# Patient Record
Sex: MALE | Race: WHITE | NOT HISPANIC OR LATINO | Employment: PART TIME | URBAN - METROPOLITAN AREA
[De-identification: names, ages, dates, MRNs, and addresses within clinical notes are randomized per-mention and may not be internally consistent; named-entity substitution may affect disease eponyms.]

---

## 2018-01-05 ENCOUNTER — TELEPHONE (OUTPATIENT)
Dept: UROLOGY | Facility: CLINIC | Age: 63
End: 2018-01-05

## 2018-01-05 NOTE — TELEPHONE ENCOUNTER
Referral received for patient to be seen for gross hematuria.     Left message for patient to call back to schedule an appt.  Referral in top mail slots.

## 2018-01-22 ENCOUNTER — OFFICE VISIT (OUTPATIENT)
Dept: UROLOGY | Facility: CLINIC | Age: 63
End: 2018-01-22
Payer: OTHER GOVERNMENT

## 2018-01-22 ENCOUNTER — TELEPHONE (OUTPATIENT)
Dept: UROLOGY | Facility: CLINIC | Age: 63
End: 2018-01-22

## 2018-01-22 ENCOUNTER — APPOINTMENT (OUTPATIENT)
Dept: LAB | Facility: HOSPITAL | Age: 63
End: 2018-01-22
Attending: NURSE PRACTITIONER
Payer: OTHER GOVERNMENT

## 2018-01-22 VITALS
HEART RATE: 73 BPM | SYSTOLIC BLOOD PRESSURE: 131 MMHG | HEIGHT: 73 IN | BODY MASS INDEX: 31.38 KG/M2 | WEIGHT: 236.75 LBS | DIASTOLIC BLOOD PRESSURE: 84 MMHG

## 2018-01-22 DIAGNOSIS — R31.0 HEMATURIA, GROSS: Primary | ICD-10-CM

## 2018-01-22 PROCEDURE — 99215 OFFICE O/P EST HI 40 MIN: CPT | Mod: PBBFAC,PN | Performed by: NURSE PRACTITIONER

## 2018-01-22 PROCEDURE — 99203 OFFICE O/P NEW LOW 30 MIN: CPT | Mod: S$PBB,,, | Performed by: NURSE PRACTITIONER

## 2018-01-22 PROCEDURE — 87086 URINE CULTURE/COLONY COUNT: CPT

## 2018-01-22 PROCEDURE — 88112 CYTOPATH CELL ENHANCE TECH: CPT | Mod: 26,,, | Performed by: PATHOLOGY

## 2018-01-22 PROCEDURE — 88112 CYTOPATH CELL ENHANCE TECH: CPT | Performed by: PATHOLOGY

## 2018-01-22 PROCEDURE — 99999 PR PBB SHADOW E&M-EST. PATIENT-LVL V: CPT | Mod: PBBFAC,,, | Performed by: NURSE PRACTITIONER

## 2018-01-22 NOTE — TELEPHONE ENCOUNTER
Spoke with Pt. Pt notified of appt scheduled with Dr Galeas on wed 1/24/18 at 1:30. Office location given. Pt verbalized understanding.

## 2018-01-22 NOTE — PATIENT INSTRUCTIONS
Blood in the Urine    Blood in the urine (hematuria) has many possible causes. If it occurs after an injury (such as a car accident or fall), it is most often a sign of bruising to the kidney or bladder. Common causes of blood in the urine include urinary tract infections, kidney stones, inflammation, tumors, or certain other diseases of the kidney or bladder. Menstruation can cause blood to appear in the urine sample, although it is not coming from the urinary tract.  If only a trace amount of blood is present, it will show up on the urine test, even though the urine may be yellow and not pink or red. This may occur with any of the above conditions, as well as heavy exercise or high fever. In this case, your doctor may want to repeat the urine test on another day. This will show if the blood is still present. If it is, then other tests can be done to find out the cause.  Home care  Follow these home care guidelines:  · If your urine does not appear bloody (pink, brown or red) then you do not need to restrict your activity in any way.  · If you can see blood in your urine, rest and avoid heavy exertion until your next exam. Do not use aspirin, blood thinners, or anti-platelet or anti-inflammatory medicines. These include ibuprofen and naproxen. These thin the blood and may increase bleeding.  Follow-up care  Follow up with your healthcare provider, or as advised. If you were injured and had blood in your urine, you should have a repeat urine test in 1 to 2 days. Contact your doctor for this test.  A radiologist will review any X-rays that were taken. You will be told of any new findings that may affect your care.  When to seek medical advice  Call your healthcare provider right away if any of these occur:  · Bright red blood or blood clots in the urine (if you did not have this before)  · Weakness, dizziness or fainting  · New groin, abdominal, or back pain  · Fever of 100.4ºF (38ºC) or higher, or as directed by  your healthcare provider  · Repeated vomiting  · Bleeding from the nose or gums or easy bruising  Date Last Reviewed: 9/1/2016 © 2000-2017 [employer]. [address] [place] [address], [place] [address]. All rights reserved. This information is not intended as a substitute for professional medical care. Always follow your healthcare professional's instructions.        What is Hematuria?  Blood in your urine is a condition known as hematuria. Most of the time, the cause of hematuria is not serious. But, never ignore blood in the urine. Your doctor can evaluate you to find the cause of the bleeding and treat it, if needed.  Types of hematuria  · Gross hematuria means that the blood can be seen by the naked eye. The urine may look pinkish, brownish, or bright red.  · Microscopic hematuria means that the urine is clear, but blood cells can be seen when urine is looked at under a microscope or tested in a lab.  Both types of hematuria can have the same causes. Neither one is necessarily more serious than the other. With either type, you may have other symptoms, such as pain, pressure, or burning when you urinate, abdominal pain, or back pain. Or, you may not have any other symptoms. No matter how much blood is found, the cause of the bleeding needs to be identified.  Finding the cause of hematuria  To evaluate your condition, your doctor will first confirm that blood is indeed present. Then other tests are done to pinpoint where the blood is coming from and why. Your doctor will decide which tests will best determine the cause of your hematuria. Some common tests are listed below.  · History and physical exam  · Lab tests may include urinalysis, a urine culture, a urine cytology, and various blood tests  · Cystoscopy  · Computed tomography (CT) or CT urography  · Magnetic resonance imaging (MRI) or MR urography  · Ultrasound of the kidney  · Kidney biopsy  Causes of hematuria include the very benign (exercised  induced hematuria) to the very severe (cancer of the urinary system). A variety of treatments are available depending on the cause.  Date Last Reviewed: 12/2/2016 © 2000-2017 [employer]. [address] [place] [address], [place] [address]. All rights reserved. This information is not intended as a substitute for professional medical care. Always follow your healthcare professional's instructions.        When Your Child Has Hematuria: Urologic Causes     With microscopic hematuria, blood cells can be seen when urine is looked at under a microscope.     When your child has blood in his or her urine, it's called hematuria. This can be scary to hear. But there are many reasons why hematuria occurs that are not serious. Your healthcare provider suspects a problem in your childs urinary tract is causing hematuria. One or more tests are needed to determine the exact cause. Once the cause is found, the problem can be treated or managed if necessary.   There are two types of hematuria:  · Gross hematuria means that blood can be seen when looking at the urine with the naked eye. The urine may look pinkish, brownish, or bright red.  · Microscopic hematuria means that the urine appears clear, but blood cells can be seen when the urine is looked at under a microscope.  Both types may indicate a problem somewhere in the body. But one is not more serious than the other.  What are the possible causes of hematuria?  · Runs in a family  · Bladder or kidney infection  · Recent strep (streptococcal) infection  · Certain medicines  · Vigorous exercise  · Damage to the urinary tract or catheter use  · Kidney stones  · Blockage in the urinary tract  · Diseases, such as sickle cell anemia  · Kidney disease  How is hematuria diagnosed?  Your healthcare provider will ask you questions about your childs health. A physical exam will also be done to look for problems. One or more of the following tests may be done to find the cause of  your childs hematuria:  · Urinalysis to examine the urine for blood or other problems  · Blood tests to look for infection or kidney disease  · Kidney and bladder ultrasound to create images of the kidney and bladder using sound waves  · A KUB (kidney, ureter, bladder) X-ray to determine if kidney stones or another problem is present  · CT (computed tomography) scan to give the health care provider a more detailed image of the kidney and bladder than a regular X-ray  · Voiding cystourethrogram (VCUG) X-ray to show if reflux (backward flow of urine) is present and how the bladder and urethra function, especially during urination  · Cystoscopy to see inside the urethra and bladder, using a small scope with a camera attached at the end  How is hematuria treated?   Treatment depends on whats causing the bleeding. Your childs healthcare provider will tell you more after the exact cause is determined.  Date Last Reviewed: 12/1/2016  © 5246-6606 [employer]. [address] [place] [address], [place] [address]. All rights reserved. This information is not intended as a substitute for professional medical care. Always follow your healthcare professional's instructions.

## 2018-01-22 NOTE — PROGRESS NOTES
[facility] Note  Staff: EMILY Hammond    Referring provider and please cc: Dr. Vera Prado  PCP: Dr. Vera Prado    Chief Complaint:  Intermittent gross hematuria  Subjective:        HPI: Tj Gutierrez is a 62 y.o. male NEW PATIENT to Urology clinic today referred by Dr. Prado's office for spotting blood for >one year.  Pt states to me today it does not matter what he is doing it comes and goes whether he does physical activity, no activity, or with sexual intercourse he will have intermittent gross hematuria.  He is unable to find a reason why it happens.  He does admit today he has been ignoring symptoms for over a year and is now seeing us today for further evaluation.  (Dr. Prado's ov notes state he has had this now for two years)    Questions asked the pt during ov today:  Urgency: None, urge incontinence? None  BUT, sometimes he feels that he does not completely empty his bladder.  NTF:1 x night, DTF: None  Dysuria: No  Gross Hematuria:Yes- for over a year.  Straining:No, Hesistancy:No, Intermittency:No, Weak stream:No  STDs in past: No  Vasectomy: Yes, .    Last PSA Screenin18   4.87  (Done @Dr. Prado's office)    REVIEW OF SYSTEMS:  Review of Systems   Constitutional: Negative for chills, diaphoresis, fever and weight loss.   HENT: Negative for congestion, hearing loss, nosebleeds and sore throat.    Eyes: Negative for blurred vision and pain.        Wears glasses   Respiratory: Negative for cough and wheezing.    Cardiovascular: Negative for chest pain, palpitations and leg swelling.   Gastrointestinal: Negative for abdominal pain, heartburn, nausea and vomiting.   Genitourinary: Positive for hematuria. Negative for dysuria, flank pain, frequency and urgency.   Musculoskeletal: Negative for back pain, joint pain, myalgias and neck pain.   Skin: Negative for itching and rash.   Neurological: Negative for dizziness, tremors, sensory change, seizures,  loss of consciousness, weakness and headaches.   Endo/Heme/Allergies: Does not bruise/bleed easily.   Psychiatric/Behavioral: Negative for depression and suicidal ideas. The patient is not nervous/anxious.      Physical Exam    PMHx:  Past Medical History:   Diagnosis Date    Hypertension      Kidney stones: No  Wears glasses     PSHx:  Past Surgical History:   Procedure Laterality Date    COLONOSCOPY W/ POLYPECTOMY  10/24/2017    NASAL SEPTOPLASTY W/ TURBINOPLASTY  06/09/2017    SHOULDER SURGERY      VEIN LIGATION AND STRIPPING       Stents/Valves/Foreign Bodies: No  Cardiac Evaluation: No    Screening Studies  Colonoscopy: Last procedure was performed on 2017    Fam Hx:   malignancies: No    kidney stones: No     Soc Hx:  No tobacco use  No alcohol use    Allergies:  Patient has no known allergies.    Medications: reviewed   Anticoagulation: No    Objective:     Vitals:    01/22/18 1057   BP: 131/84   Pulse: 73     General:WDWN in NAD  Eyes: PERRLA, normal conjunctiva  Respiratory: no increased work on breathing, clear to auscultation  Cardiovascular: regular rate and rhythm. No obvious extremity edema.  GI: palpation of masses. No tenderness. No hepatosplenomegaly to palpation.  Musculoskeletal: normal range of motion of bilateral upper extremities. Normal muscle strength and tone.  Skin: no obvious rashes or lesions. No tightening of skin noted.  Neurologic: CN grossly normal. Normal sensation.   Psychiatric: awake, alert and oriented x 3. Mood and affect normal. Cooperative.    LABS REVIEW:  UA today:  Color:Clear, Yellow  Spec. Grav.  1.015  PH  5.0  Negative for leukocytes, nitrates, protein, glucose, ketones, urobili, bili, and blood.    Assessment:       1. Hematuria, gross          Plan:     1.  We will send urine for culture and cytology testing.  2.  CT Urogram and serum cr to be performed prior to next visit.    F/u with Urologist for intermittent gross hematuria work up, lee KENYON  Gary, ANIAP-C

## 2018-01-24 ENCOUNTER — TELEPHONE (OUTPATIENT)
Dept: UROLOGY | Facility: CLINIC | Age: 63
End: 2018-01-24

## 2018-01-24 ENCOUNTER — OFFICE VISIT (OUTPATIENT)
Dept: UROLOGY | Facility: CLINIC | Age: 63
End: 2018-01-24
Payer: OTHER GOVERNMENT

## 2018-01-24 VITALS
SYSTOLIC BLOOD PRESSURE: 139 MMHG | HEIGHT: 73 IN | BODY MASS INDEX: 31.26 KG/M2 | HEART RATE: 61 BPM | TEMPERATURE: 98 F | WEIGHT: 235.88 LBS | DIASTOLIC BLOOD PRESSURE: 84 MMHG

## 2018-01-24 DIAGNOSIS — N36.8 URETHRAL BLEEDING: Primary | ICD-10-CM

## 2018-01-24 DIAGNOSIS — R97.20 ELEVATED PSA: ICD-10-CM

## 2018-01-24 LAB — BACTERIA UR CULT: NO GROWTH

## 2018-01-24 PROCEDURE — 99215 OFFICE O/P EST HI 40 MIN: CPT | Mod: S$PBB,,, | Performed by: UROLOGY

## 2018-01-24 PROCEDURE — 99999 PR PBB SHADOW E&M-EST. PATIENT-LVL IV: CPT | Mod: PBBFAC,,, | Performed by: UROLOGY

## 2018-01-24 PROCEDURE — 99214 OFFICE O/P EST MOD 30 MIN: CPT | Mod: PBBFAC,PN | Performed by: UROLOGY

## 2018-01-24 RX ORDER — GENTAMICIN SULFATE 40 MG/ML
80 INJECTION, SOLUTION INTRAMUSCULAR; INTRAVENOUS ONCE
Status: CANCELLED | OUTPATIENT
Start: 2018-01-30

## 2018-01-24 RX ORDER — CIPROFLOXACIN 500 MG/1
500 TABLET ORAL 2 TIMES DAILY
Qty: 6 TABLET | Refills: 0 | Status: SHIPPED | OUTPATIENT
Start: 2018-01-24

## 2018-01-24 NOTE — LETTER
January 24, 2018        Vear Prado MD  [address] [place] [address] [place] [id number]             [place] [facility]  [address] [place] [address] [address]  [place] [address]  Phone: [phone]  Fax: [phone]   Patient: Tj Gutierrez   MR Number: [id number]   YOB: 1955   Date of Visit: 1/24/2018       Dear Dr. Prado:    Thank you for referring Tj Gutierrez to me for evaluation. Attached you will find relevant portions of my assessment and plan of care.    If you have questions, please do not hesitate to call me. I look forward to following Tj Gutierrez along with you.    Sincerely,        Daniel Galeas MD            CC  No Recipients    Enclosure

## 2018-01-24 NOTE — TELEPHONE ENCOUNTER
Called Dr Prado's office to request records on Pt. Phone number given to [facility] [phone]. Contacted and requested records for today.

## 2018-01-24 NOTE — PROGRESS NOTES
[facility]:    Tj Gutierrez is a 62 y.o. male who presents for evaluation of gross hematuria.    He was referred to our practice by Dr Prado and saw NP Gary earlier this week reporting spotting of blood in his urine for more than one year, which happens with or without physical activity as well as with intercourse. He reported intermittent gross hematuria, which he noted as blood spotting in his underwear, for 2 years at visit with Dr Prado 1/2/18, not mentioned to Dr. Prado previously, despite having seen him multiple times in 2017  He denied urgency/UUI, frequency, hesitancy, intermittency, weak stream. Nocturia x1.  Questionable history of prostatitis 4/13/15  PSA 01/02/18 4.87     PSA history  1/2/18 - 4.87  2/7/17 - 3.8  4/13/15 - 3.6    He did have a colonoscopy with Dr. Pedraza on 10/24/17 noting 3 small polyps, 2 rectal, and one adjacent to the appendiceal orifice with recommendations to repeat colonoscopy in 5-10 years based on biopsy results    He reports today he has never seen blood in his actual urine but has seen blood spotting from tip of the penis, with very bright red spot in his underwear.  Has been 1 week since he saw blood in his underwear. May have an episode once every 10 days.  Was a  at some point.  Denies blood in the semen.  Never smoker.  No personal or family history of kidney.  No family history of any prostate cancer or genitourinary malignancy.  Notes that he has been in denial that this has been happening.  Has no identifiable pattern (as far as relation to activity, intercourse, etc) though does often happen overnight.  Denies ejaculatory, perineal, perirectal pain.  No hesitancy, no intermittency, mild PV dribble, NTF 0-1x (once is only if drinking a lot of fluids), no urgency. DTF 4x. Mild push/strain to pass urine.  Normal daily BMs.  Coffee - 3 big cups in AM, juice/water throughout day, hot mild at night  Mostly bothered by pushing and straining to empty  bladder especially at the end of the stream - so may have some terminal intermittency  Works part time security job  No dysuria, urethral pain, urethral discharge.    Udip negative today       Past Medical History:   Diagnosis Date    Hypertension     humerus fracture 9/14/16  MDD 2/7/17    Past Surgical History:   Procedure Laterality Date    COLONOSCOPY W/ POLYPECTOMY  10/24/2017    NASAL SEPTOPLASTY W/ TURBINOPLASTY  06/09/2017    SHOULDER SURGERY      VEIN LIGATION AND STRIPPING         No family history on file.    Social History     Social History    Marital status:      Spouse name: N/A    Number of children: N/A    Years of education: N/A     Occupational History    Not on file.     Social History Main Topics    Smoking status: Never Smoker    Smokeless tobacco: Never Used    Alcohol use No    Drug use: No    Sexual activity: Yes     Other Topics Concern    Not on file     Social History Narrative    No narrative on file       Review of patient's allergies indicates:  No Known Allergies    Medications Reviewed: see MAR    ROS:    As noted above in HPI otherwise negative x 10 systems reviewed     PHYSICAL EXAM:    Vitals:    01/24/18 1356   BP: 139/84   Pulse: 61   Temp: 98.2 °F (36.8 °C)     Body mass index is 31.12 kg/m².           General: Alert, cooperative, no distress, appears stated age  Head: Normocephalic, without obvious abnormality, atraumatic  Neck: no masses, no thyromegaly, no lymphadenopathy  Eyes: PERRL, conjunctiva/corneas clear  Lungs: Respirations unlabored, normal effort, no accessory muscle use  CV: Warm and well perfused extremities  Abdomen: Soft, non-tender, no CVA tenderness, no hepatosplenomegaly, no hernia  Penis: phallus normal, circumcised, well cared for, no plaques or lesions.   Scrotum: no cysts, no lesions, no rash, no hydrocele.   Epididymes: normal, nontender, symmetrical, no masses or cysts.   Testes: normal, both descended, no masses.   Urethra:  "palpably normal with orthotopic meatus of normal size    DANITA: normal sphincter tone, no masses, no hemmorrhoids   PROSTATE: 35g, no nodules, non-tender, smooth, subtle consistency difference R vs L    Extremities: Extremities normal, atraumatic, no cyanosis or edema  Skin: Normal color, texture, and turgor, no rashes or lesions  Psych: Appropriate, well oriented, normal affect, normal mood  Neuro: Non-focal        Recent Results (from the past 336 hour(s))   Urine culture    Collection Time: 01/22/18 11:36 AM   Result Value Ref Range    Urine Culture, Routine No growth          Assessment/Diagnosis:    1. Urethral bleeding  Case Request Operating Room: TRANSRECTAL ULTRASOUND GUIDED PROSTATE BIOPSY, CYSTOSCOPY   2. Elevated PSA  Case Request Operating Room: TRANSRECTAL ULTRASOUND GUIDED PROSTATE BIOPSY, CYSTOSCOPY    Place in Outpatient    Vital Signs     Activity as tolerated       Plans:    He does not seem to have gross hematuria, though he does have blood per urethra on occasion.  His urinalysis is negative for any signs of blood today.  And he denies actual red or pink urine.  He does confirm blood per urethral meatus spotting in his underwear.  I do agree with a gross hematuria workup, as this is blood from his urinary tract.  He is already scheduled for CT urogram in 2 days.  His urine culture was negative, and his urine cytology is pending.  We did discuss cystoscopy to complete gross hematuria workup and described in detail the procedure office-based diagnostic flexible cystourethroscopy with local anesthesia intraurethrally with Xylocaine jelly.  Not only will this evaluate the lower tract for any causes of blood in the urine, or per the urethra, but also evaluate for any obstruction including prostatic.    I am most suspicious that the source of bleeding is his prostate.  He is rather vague about if any activities exacerbated the condition or bring it on, though as he listed the activities he "wondered if " ""or causing it, he did list sexual intercourse, heavy activity, long truck rides, etc. though denies any specific activity inciting the blood he sees.  He does however have an elevated PSA at 4.8.  I had a long discussion with the patient regarding the natural history of cancer in men as well as when diagnostics are indicated. We also discussed differential for elevated psa which also includes benign enlargement and prostatitis.  He exhibits no signs or symptoms of prostatitis, and though he has a vague history of it, he denies those symptoms at that time in 2015 as well and had more UTI symptoms which resolved with a short course of antibiotics per his recollection.  No PSA changes from baseline at that time.     We did discuss that an elevated PSA is considered a PSA greater than 4 because statistically 20% of people in this value range are found to have prostate cancer, however we also discussed a bit about PSA velocity and age-specific elevations, as well as PSA relative to prostate volume (PSA density). His finding of PSA of 4.8, both elevated and elevated for his age and estimated prostate size, is also a significant velocity change from prior baseline over the last 3 years.  In this patient with elevated PSA and with mild obstructive lower urinary tract symptoms, and blood per urethra,  I did therefore recommended prostate biopsy to evaluate for underlying malignancy.  We discussed biopsy and in detail, including 1% risk of infectious complications including sepsis but that it is an otherwise safe diagnostic procedure with expected hematuria hematospermia after.  Should his biopsy be negative, and his gross hematuria workup indicate a prostatic source of bleeding, can discuss medical management.  Did note would need this full evaluation prior and patient was agreeable to treatment plan.     I went over the details of a transrectal ultrasound-guided biopsy of the prostate, and described the technique in " detail.   The patient will be given local injection anesthetic to block the prostate so as to minimize any pain. 12-14 biopsy specimens will be taken. These will be sent for histopathology analysis.   Complications include bleeding, fever and chills. He was also instructed to watch for any signs of fever. If he does have any fever or chills after, he was advised to come to the emergency room right away for intravenous antibiotics and possible admission to the hospital. He is to refrain from any strenuous activity including sexual activity for the next 72 hours after biopsy. He was also advised that he may have blood while urinating, during bowel movements as well as during ejaculations. He was given a prebiopsy/postbiopsy instruction sheet was reminding him to avoid aspirin and blood thinners for 7 days prior, take the Rxed antibiotics the day before, day of, day after biopsy, and perform a fleet enema at home morning of biopsy. All questions he had were answered in detail.      Cystoscopy and TRUS/bx scheduled at [facility] on 1/30/18.

## 2018-01-26 ENCOUNTER — HOSPITAL ENCOUNTER (OUTPATIENT)
Dept: RADIOLOGY | Facility: HOSPITAL | Age: 63
Discharge: HOME | End: 2018-01-26
Attending: NURSE PRACTITIONER
Payer: OTHER GOVERNMENT

## 2018-01-26 DIAGNOSIS — R31.0 HEMATURIA, GROSS: ICD-10-CM

## 2018-01-26 PROCEDURE — 74178 CT ABD&PLV WO CNTR FLWD CNTR: CPT | Mod: 26,,, | Performed by: RADIOLOGY

## 2018-01-26 PROCEDURE — 25500020 PHARM REV CODE 255

## 2018-01-26 PROCEDURE — 74178 CT ABD&PLV WO CNTR FLWD CNTR: CPT | Mod: TC

## 2018-01-26 RX ORDER — SODIUM CHLORIDE 9 MG/ML
INJECTION, SOLUTION INTRAVENOUS
Status: DISPENSED
Start: 2018-01-26 | End: 2018-01-26

## 2018-01-26 RX ADMIN — IOHEXOL 100 ML: 350 INJECTION, SOLUTION INTRAVENOUS at 08:01

## 2018-01-30 ENCOUNTER — SURGERY (OUTPATIENT)
Age: 63
End: 2018-01-30

## 2018-01-30 ENCOUNTER — HOSPITAL ENCOUNTER (OUTPATIENT)
Facility: AMBULARY SURGERY CENTER | Age: 63
Discharge: HOME | End: 2018-01-30
Attending: UROLOGY | Admitting: UROLOGY
Payer: OTHER GOVERNMENT

## 2018-01-30 DIAGNOSIS — N36.8 URETHRAL BLEEDING: ICD-10-CM

## 2018-01-30 DIAGNOSIS — R97.20 ELEVATED PSA: ICD-10-CM

## 2018-01-30 PROCEDURE — 76942 ECHO GUIDE FOR BIOPSY: CPT | Mod: 26,59,, | Performed by: UROLOGY

## 2018-01-30 PROCEDURE — 88305 TISSUE EXAM BY PATHOLOGIST: CPT | Performed by: PATHOLOGY

## 2018-01-30 PROCEDURE — 52000 CYSTOURETHROSCOPY: CPT | Mod: 59,,, | Performed by: UROLOGY

## 2018-01-30 PROCEDURE — 76872 US TRANSRECTAL: CPT | Performed by: UROLOGY

## 2018-01-30 PROCEDURE — 55700 PR BIOPSY OF PROSTATE,NEEDLE/PUNCH: CPT | Mod: ,,, | Performed by: UROLOGY

## 2018-01-30 PROCEDURE — 55700 HC PROSTATE NEEDLE BIOPSY: CPT | Performed by: UROLOGY

## 2018-01-30 PROCEDURE — 76872 US TRANSRECTAL: CPT | Mod: 26,,, | Performed by: UROLOGY

## 2018-01-30 PROCEDURE — 52000 CYSTOURETHROSCOPY: CPT | Performed by: UROLOGY

## 2018-01-30 RX ORDER — LIDOCAINE HYDROCHLORIDE 10 MG/ML
INJECTION, SOLUTION EPIDURAL; INFILTRATION; INTRACAUDAL; PERINEURAL
Status: DISCONTINUED | OUTPATIENT
Start: 2018-01-30 | End: 2018-01-30 | Stop reason: HOSPADM

## 2018-01-30 RX ORDER — WATER 1 ML/ML
IRRIGANT IRRIGATION
Status: DISCONTINUED | OUTPATIENT
Start: 2018-01-30 | End: 2018-01-30 | Stop reason: HOSPADM

## 2018-01-30 RX ORDER — GENTAMICIN SULFATE 40 MG/ML
80 INJECTION, SOLUTION INTRAMUSCULAR; INTRAVENOUS ONCE
Status: COMPLETED | OUTPATIENT
Start: 2018-01-30 | End: 2018-01-30

## 2018-01-30 RX ORDER — LIDOCAINE HYDROCHLORIDE 20 MG/ML
JELLY TOPICAL
Status: DISCONTINUED | OUTPATIENT
Start: 2018-01-30 | End: 2018-01-30 | Stop reason: HOSPADM

## 2018-01-30 RX ORDER — LIDOCAINE HYDROCHLORIDE 20 MG/ML
JELLY TOPICAL
Status: DISPENSED
Start: 2018-01-30 | End: 2018-01-31

## 2018-01-30 RX ADMIN — GENTAMICIN SULFATE 80 MG: 40 INJECTION, SOLUTION INTRAMUSCULAR; INTRAVENOUS at 01:01

## 2018-01-30 RX ADMIN — WATER 500 ML: 1 IRRIGANT IRRIGATION at 02:01

## 2018-01-30 RX ADMIN — LIDOCAINE HYDROCHLORIDE 9 ML: 10 INJECTION, SOLUTION EPIDURAL; INFILTRATION; INTRACAUDAL; PERINEURAL at 03:01

## 2018-01-30 RX ADMIN — LIDOCAINE HYDROCHLORIDE 5 ML: 20 JELLY TOPICAL at 02:01

## 2018-01-30 NOTE — H&P (VIEW-ONLY)
[facility] Urology:    Tj Gutierrez is a 62 y.o. male who presents for evaluation of gross hematuria.    He was referred to our practice by Dr Prado and saw NP Gary earlier this week reporting spotting of blood in his urine for more than one year, which happens with or without physical activity as well as with intercourse. He reported intermittent gross hematuria, which he noted as blood spotting in his underwear, for 2 years at visit with Dr Prado 1/2/18, not mentioned to Dr. Prado previously, despite having seen him multiple times in 2017  He denied urgency/UUI, frequency, hesitancy, intermittency, weak stream. Nocturia x1.  Questionable history of prostatitis 4/13/15  PSA 01/02/18 4.87     PSA history  1/2/18 - 4.87  2/7/17 - 3.8  4/13/15 - 3.6    He did have a colonoscopy with Dr. Pedraza on 10/24/17 noting 3 small polyps, 2 rectal, and one adjacent to the appendiceal orifice with recommendations to repeat colonoscopy in 5-10 years based on biopsy results    He reports today he has never seen blood in his actual urine but has seen blood spotting from tip of the penis, with very bright red spot in his underwear.  Has been 1 week since he saw blood in his underwear. May have an episode once every 10 days.  Was a  at some point.  Denies blood in the semen.  Never smoker.  No personal or family history of kidney.  No family history of any prostate cancer or genitourinary malignancy.  Notes that he has been in denial that this has been happening.  Has no identifiable pattern (as far as relation to activity, intercourse, etc) though does often happen overnight.  Denies ejaculatory, perineal, perirectal pain.  No hesitancy, no intermittency, mild PV dribble, NTF 0-1x (once is only if drinking a lot of fluids), no urgency. DTF 4x. Mild push/strain to pass urine.  Normal daily BMs.  Coffee - 3 big cups in AM, juice/water throughout day, hot mild at night  Mostly bothered by pushing and straining to empty  bladder especially at the end of the stream - so may have some terminal intermittency  Works part time security job  No dysuria, urethral pain, urethral discharge.    Udip negative today       Past Medical History:   Diagnosis Date    Hypertension     humerus fracture 9/14/16  MDD 2/7/17    Past Surgical History:   Procedure Laterality Date    COLONOSCOPY W/ POLYPECTOMY  10/24/2017    NASAL SEPTOPLASTY W/ TURBINOPLASTY  06/09/2017    SHOULDER SURGERY      VEIN LIGATION AND STRIPPING         No family history on file.    Social History     Social History    Marital status:      Spouse name: N/A    Number of children: N/A    Years of education: N/A     Occupational History    Not on file.     Social History Main Topics    Smoking status: Never Smoker    Smokeless tobacco: Never Used    Alcohol use No    Drug use: No    Sexual activity: Yes     Other Topics Concern    Not on file     Social History Narrative    No narrative on file       Review of patient's allergies indicates:  No Known Allergies    Medications Reviewed: see MAR    ROS:    As noted above in HPI otherwise negative x 10 systems reviewed     PHYSICAL EXAM:    Vitals:    01/24/18 1356   BP: 139/84   Pulse: 61   Temp: 98.2 °F (36.8 °C)     Body mass index is 31.12 kg/m².           General: Alert, cooperative, no distress, appears stated age  Head: Normocephalic, without obvious abnormality, atraumatic  Neck: no masses, no thyromegaly, no lymphadenopathy  Eyes: PERRL, conjunctiva/corneas clear  Lungs: Respirations unlabored, normal effort, no accessory muscle use  CV: Warm and well perfused extremities  Abdomen: Soft, non-tender, no CVA tenderness, no hepatosplenomegaly, no hernia  Penis: phallus normal, circumcised, well cared for, no plaques or lesions.   Scrotum: no cysts, no lesions, no rash, no hydrocele.   Epididymes: normal, nontender, symmetrical, no masses or cysts.   Testes: normal, both descended, no masses.   Urethra:  "palpably normal with orthotopic meatus of normal size    DANITA: normal sphincter tone, no masses, no hemmorrhoids   PROSTATE: 35g, no nodules, non-tender, smooth, subtle consistency difference R vs L    Extremities: Extremities normal, atraumatic, no cyanosis or edema  Skin: Normal color, texture, and turgor, no rashes or lesions  Psych: Appropriate, well oriented, normal affect, normal mood  Neuro: Non-focal        Recent Results (from the past 336 hour(s))   Urine culture    Collection Time: 01/22/18 11:36 AM   Result Value Ref Range    Urine Culture, Routine No growth          Assessment/Diagnosis:    1. Urethral bleeding  Case Request Operating Room: TRANSRECTAL ULTRASOUND GUIDED PROSTATE BIOPSY, CYSTOSCOPY   2. Elevated PSA  Case Request Operating Room: TRANSRECTAL ULTRASOUND GUIDED PROSTATE BIOPSY, CYSTOSCOPY    Place in Outpatient    Vital Signs     Activity as tolerated       Plans:    He does not seem to have gross hematuria, though he does have blood per urethra on occasion.  His urinalysis is negative for any signs of blood today.  And he denies actual red or pink urine.  He does confirm blood per urethral meatus spotting in his underwear.  I do agree with a gross hematuria workup, as this is blood from his urinary tract.  He is already scheduled for CT urogram in 2 days.  His urine culture was negative, and his urine cytology is pending.  We did discuss cystoscopy to complete gross hematuria workup and described in detail the procedure office-based diagnostic flexible cystourethroscopy with local anesthesia intraurethrally with Xylocaine jelly.  Not only will this evaluate the lower tract for any causes of blood in the urine, or per the urethra, but also evaluate for any obstruction including prostatic.    I am most suspicious that the source of bleeding is his prostate.  He is rather vague about if any activities exacerbated the condition or bring it on, though as he listed the activities he "wondered if " ""or causing it, he did list sexual intercourse, heavy activity, long truck rides, etc. though denies any specific activity inciting the blood he sees.  He does however have an elevated PSA at 4.8.  I had a long discussion with the patient regarding the natural history of cancer in men as well as when diagnostics are indicated. We also discussed differential for elevated psa which also includes benign enlargement and prostatitis.  He exhibits no signs or symptoms of prostatitis, and though he has a vague history of it, he denies those symptoms at that time in 2015 as well and had more UTI symptoms which resolved with a short course of antibiotics per his recollection.  No PSA changes from baseline at that time.     We did discuss that an elevated PSA is considered a PSA greater than 4 because statistically 20% of people in this value range are found to have prostate cancer, however we also discussed a bit about PSA velocity and age-specific elevations, as well as PSA relative to prostate volume (PSA density). His finding of PSA of 4.8, both elevated and elevated for his age and estimated prostate size, is also a significant velocity change from prior baseline over the last 3 years.  In this patient with elevated PSA and with mild obstructive lower urinary tract symptoms, and blood per urethra,  I did therefore recommended prostate biopsy to evaluate for underlying malignancy.  We discussed biopsy and in detail, including 1% risk of infectious complications including sepsis but that it is an otherwise safe diagnostic procedure with expected hematuria hematospermia after.  Should his biopsy be negative, and his gross hematuria workup indicate a prostatic source of bleeding, can discuss medical management.  Did note would need this full evaluation prior and patient was agreeable to treatment plan.     I went over the details of a transrectal ultrasound-guided biopsy of the prostate, and described the technique in " detail.   The patient will be given local injection anesthetic to block the prostate so as to minimize any pain. 12-14 biopsy specimens will be taken. These will be sent for histopathology analysis.   Complications include bleeding, fever and chills. He was also instructed to watch for any signs of fever. If he does have any fever or chills after, he was advised to come to the emergency room right away for intravenous antibiotics and possible admission to the hospital. He is to refrain from any strenuous activity including sexual activity for the next 72 hours after biopsy. He was also advised that he may have blood while urinating, during bowel movements as well as during ejaculations. He was given a prebiopsy/postbiopsy instruction sheet was reminding him to avoid aspirin and blood thinners for 7 days prior, take the Rxed antibiotics the day before, day of, day after biopsy, and perform a fleet enema at home morning of biopsy. All questions he had were answered in detail.      Cystoscopy and TRUS/bx scheduled at [facility] on 1/30/18.

## 2018-01-30 NOTE — INTERVAL H&P NOTE
The patient has been examined and the H&P has been reviewed:    No change, proceed with cysto/bx. Pt is acceptable candidate for procedure at asc    Anesthesia/Surgery risks, benefits and alternative options discussed and understood by patient/family.          Active Hospital Problems    Diagnosis  POA    Urethral bleeding [N36.8]  Yes      Resolved Hospital Problems    Diagnosis Date Resolved POA   No resolved problems to display.

## 2018-01-30 NOTE — DISCHARGE INSTRUCTIONS
After the procedure    · Drink plenty of fluids.  · You may have burning or light bleeding when you urinate--this is normal.  · Medications may be prescribed to ease any discomfort or prevent infection. Take these as directed.  · Call your doctor if you have heavy bleeding or blood clots, burning that lasts more than a day, a fever over 100°F  (38° C), or trouble urinating.        After your prostate biopsy    Avoid sexual activity,lifting, strenuous physical activity or exertion for 3 days     No riding mowers, t ractors, bicycles, motorcycles for 2-3 weeks    You may experience blood in your urine or stool for up to 2 weeks and in your sement for up to 6 weeks.  This is a normal side effect of the procedure and will resolve.    Drink plenty of water    Take antibiotics as prescribed    If you experience any of the following conditions, please return immediately to the clinic (during office hrs) or the Emergency Rjoom if after hours:       Fever       Inabiltiy to urinate       Sever bleeding    You may resume aspirin, anti imflammatory and blood thinners in 3 days    Results take at minimum 10 business days.  A 2 week follow up will be scheduled to review pathology reports in the clinic    During office hours, please call  and ask to speakwith the nurse if you have any questions.  If after hours, call the [facility] On Call # to be connectied t o the doctor on call

## 2018-01-31 NOTE — OP NOTE
[facility] Operative/Brief Discharge Note     Date: 1/30/18     Staff Surgeon: Daniel Galeas MD     Pre-Op Diagnosis:   1. Elevated psa  2. Urethral bleeding with mild LUTS     Post-Op Diagnosis: same     Procedure(s) Performed:   1. Transrectal ultrasound guided prostate needle biopsy  2. Cystoscopy (flexible)     INDICATION FOR PROCEDURE:   61 yo M with episodes of blood per urethra for over a year, without gross hematuria. Mild LUTS with occasional push/strain to urinate and terminal intermittency. Also found to have psa 4.87 1/2/18 from baseline 3.6-3.8.      ANESTHESIA: Local periprostatic block; 10 cc 1% lidocaine, and urojet 2% xylocaine per urethra     PSA:  4.87     TRUS VOLUME: 69.5  (W 57.6, H 41.8, L 55.2)     EBL: Minimal     SPECIMEN:   14 Prostate Biopsy Cores: right and left base, apex, and mid - medial and lateral of each - as well as right and left transition zone.        ULTRASOUND FINDINGS: grossly normal prostate ultrasound with scattered small hypoechoic areas     CYSTO FINDINGS: normal bladder mucosa with moderate ingrowth of bilateral lateral lobes causing significant obstruction 90%+, and mild to moderate median lobe ingrowth without any obstruction and with patent bladder neck, and some hypervascularity along median lobe     CONFIRMED PATIENT TOOK ANTIBIOTICS: Yes     CONFIRMED PATIENT NOT TAKING ASPIRIN OR ANTICOAGULANTS: Yes     CONFIRMED PATIENT USED ENEMA: Yes     PROCEDURE IN DETAIL:  After informed consent, the patient was prepped and drapped in standard  cystoscopic fashion and 2% xylocaine jelly was instilled into the urethra. 80mg gentamicin injected IM.     First, a flexible cystoscope was passed into the bladder via the urethra.   Anterior urethra appeared normal without any lesions or narrowings. The prostatic urethra demonstrated moderate enlargement and ingrowth of the bilateral lateral lobes with significant lumenal obstruction about 90%, though patent bladder neck  with good coaptation.     The bladder was then systematically inspected. The ureteral orifices were in the orthotopic position on the trigone with clear efflux bilaterally, seen more easily on retroflexion. The bladder mucosa, including the lateral walls, posterior wall, and dome were normal in appearance and free of any lesions or tumors.  On retroflexion, mild to moderate median lobe ingrowth without any obstruction and some hypervascularity over the median lobe.  Flexible cystoscope then removed.     Patient was then turned to the left lateral position and TRUS probe inserted into rectum. Ultrasound measurements taken as above and ultrasound of prostate performed with findings as above. Approximately 10cc of 1% lidocaine injected bilaterally in montserrat-prostatic block fashion, as well as at the apex.   14 core biopies taken in a sextant fashion, as described in specimens as above.   standard 12 core biopsy template, with the addition of  transition zones. Patient tolerated well without complication.     CONDITION: Stable     DISHARGE:  Status post uncomplicated outpatient procedure as above.   Disposition: Home.  He will follow up in 2 weeks for biopsy results.  We did discuss that his blood per urethra is likely secondary to prostatic source given history and level of prostatic obstruction. Should his biopsy be benign, it would be reasonable to discuss management of his prostatic obstruction, such as Urolift.    Resume regular diet  FU 2 weeks for biopsy results  Return to ER if temp >101, uncontrollable urethral or rectal bleeding, or inability to urinate/urinary retention  No sex/ejaculation x3 days, no riding mowers/tractors/bikes x2-4 weeks  Drink plenty of water may see blood in urine     Meds:     Medication List      CONTINUE taking these medications    bisoprolol 5 MG tablet  Commonly known as:  ZEBETA     ciprofloxacin HCl 500 MG tablet  Commonly known as:  CIPRO  Take 1 tablet (500 mg total) by mouth 2  (two) times daily. Day before, of, after biopsy as instructed        STOP taking these medications    FLEET BISACODYL 10 mg/30 mL Enem  Generic drug:  bisacodyl

## 2018-02-01 VITALS
WEIGHT: 235.88 LBS | HEIGHT: 73 IN | BODY MASS INDEX: 31.26 KG/M2 | TEMPERATURE: 98 F | RESPIRATION RATE: 18 BRPM | SYSTOLIC BLOOD PRESSURE: 144 MMHG | OXYGEN SATURATION: 100 % | DIASTOLIC BLOOD PRESSURE: 82 MMHG | HEART RATE: 54 BPM

## 2018-02-07 ENCOUNTER — TELEPHONE (OUTPATIENT)
Dept: UROLOGY | Facility: CLINIC | Age: 63
End: 2018-02-07

## 2018-02-07 NOTE — TELEPHONE ENCOUNTER
Attempted to return the patient's call. Patient did not answer, I did leave a voicemail for the patient as well as provided the patient with a call back number to return the call.

## 2018-02-07 NOTE — TELEPHONE ENCOUNTER
----- Message from Emelina Wilhelm sent at 2/7/2018  1:08 PM CST -----  Contact: self-  [phone]  Patient is calling for biopsy, cystoscope test results. Thanks!

## 2018-02-10 NOTE — PROGRESS NOTES
[facility] Urology Progress Note    Tj Gutierrez is a 62 y.o. male who presents for follow up of blood spotting per urethra and elevated psa, s/p cysto/TRUS biopsy on 1/30/18.    Referred by Dr Prado for concerns of blood in his urine for more than one year, but actually denied any gross hematuria and reported as intermittent spotting of blood per urethra mostly noticed on underwear, unrelated to activit.  Questionable history of prostatitis 4/13/15. PSA at that time 3.6, then 3.8 2/7/17, but most recently 4.87 on 1/2/18   May have an episode once every 10 days of noticing blood at tip of penis on underwear. No hematuria or hematospermia, never smoker, no stones, no fam Hx CaP/ Malig. No ejaculatory/perineal/testicular pain.  No hesitancy, no intermittency, mild PV dribble, NTF 0-1x (once is only if drinking a lot of fluids), no urgency. DTF 4x. Mild push/strain to pass urine. Normal daily BMs.  Mostly bothered by pushing and straining to empty bladder especially at the end of the stream - so may have some terminal intermittency  Works part time security job. Prior .  No dysuria, urethral pain, urethral discharge.     1/30/18 Cysto and Prostate biopsy  PSA 4.87.  TRUS: 69.5 cc grossly normal prostate with scattered small hypoechoic areas  CYSTO: normal bladder mucosa with moderate ingrowth of bilateral lateral lobes causing significant obstruction 90%+, and mild to moderate median lobe ingrowth without any obstruction and with patent bladder neck, and some hypervascularity along median lobe  PATHOLOGY: 14 cores benign    Returns today noting that since the biopsy has seen more blood, as expected, and does now have gross hematuria.  First thing in the morning, his urinary flow seems a bit obstructed with blood at beginning of stream, then clears.  Does seem to be improving.  Still mild pushing/straining to urinate.  No fever/chills.perineal pain    ROS: A comprehensive 10 system review was performed and  "is negative except as noted above in HPI    PHYSICAL EXAM:    Vitals:    02/14/18 1239   BP: (!) 145/94   Pulse: 65   Temp: 97.3 °F (36.3 °C)     Body mass index is 30.94 kg/m². Weight: 109.3 kg (240 lb 15.4 oz) Height: 6' 2" (188 cm)       General: Alert, cooperative, no distress, appears stated age   Head: Normocephalic, without obvious abnormality, atraumatic   Eyes: PERRL, conjunctiva/corneas clear   Lungs: Respirations unlabored   Heart: Warm and well perfused   Abdomen: soft NT ND   Extremities: Extremities normal, atraumatic, no cyanosis or edema   Skin: Skin color, texture, turgor normal, no rashes or lesions   Psych: Appropriate   Neurologic: Non-focal       ASSESSMENT   1. Benign prostatic hyperplasia (BPH) with straining on urination     2. Urethral bleeding         Plan    Fortunately his biopsy was benign.  We did discuss that his obstructing prostate, which was noted to be hypervascular, was the likely etiology of the blood per urethra that he has been seeing.  He was able to visualize this obstruction hypervascularity on cystoscopy and we did discuss that the shearing force of urine and/or ejaculation through this obstructing prostate may cause mild bleeding.  Would expected in his urine or semen, though reasonable could be latent drip of blood per urethra as he has noticed.   with a 70 g prostate, he actually has a low PSA density with a PSA of 4.8, and we'll continue to monitor PSA at regular intervals.  Despite his significant prostatic enlargement and obstruction, he has only mild obstructive symptoms with straining to urinate, and is largely not bothered by his urinary symptoms except for this one.      We did discuss that first-line therapy for obstructive lower urinary tract symptoms in the setting of an enlarged obstructing prostate as an alpha blocker such as Flomax.  He would prefer not to be on medications if possible, and we did discuss that it is reasonable to observe his LUTS without " medical therapy given his minimal bother and mild symptoms.  We did however also discuss that a 5 alpha reductase inhibitor such as finasteride, usually intended to decrease prostate volume, also is indicated to decrease prostatic source bleeding, which he is likely experiencing and has been long-term.  To control his blood per urethra and decrease it, we discussed starting finasteride, but again he is resistant to prescription medications and stated he ordered a prostate supplement and other supplements.  We did discuss that prostate supplement specifically such as saw palmetto have been well studied and are not found to be of benefits, and that other natural herbal supplements may have natural anticoagulant properties which may make his urethral bleeding worse.  We also discussed that with the intention to control his prostatic source urinary tract bleeding, finasteride and may also have benefits on his mild LUTS including straining as prostate volume decreases.  We discussed the minimal side effect profile of finasteride and low incidence of sexual side effects but otherwise safe, versus side effects of an alpha blocker such as dizziness, hypertension, retrograde ejaculation.  He will consider the finasteride, I have prescribed it to his pharmacy which he may or may not obtain.  As he is medication averse, we did discuss minimally invasive techniques such as Urolift, and surgical intervention such as TURP, to relieve prostatic obstruction, but he is also not interested in any procedures at this time.    Given his significant prostatic obstruction in the setting of an enlarged prostate with straining to urinate, we did discuss risks in the future of BPH with prostatic obstruction such as urinary retention, elevated postvoid residuals, urinary tract infections, bladder stones, renal damage.  We will keep a close eye on his LUTS, and he will return in 3 months for a uroflow PVR and repeat symptom  assessment.

## 2018-02-14 ENCOUNTER — TELEPHONE (OUTPATIENT)
Dept: UROLOGY | Facility: CLINIC | Age: 63
End: 2018-02-14

## 2018-02-14 ENCOUNTER — OFFICE VISIT (OUTPATIENT)
Dept: UROLOGY | Facility: CLINIC | Age: 63
End: 2018-02-14
Payer: OTHER GOVERNMENT

## 2018-02-14 VITALS
HEIGHT: 74 IN | WEIGHT: 240.94 LBS | DIASTOLIC BLOOD PRESSURE: 94 MMHG | SYSTOLIC BLOOD PRESSURE: 145 MMHG | HEART RATE: 65 BPM | TEMPERATURE: 97 F | BODY MASS INDEX: 30.92 KG/M2

## 2018-02-14 DIAGNOSIS — N36.8 URETHRAL BLEEDING: ICD-10-CM

## 2018-02-14 DIAGNOSIS — N40.1 BENIGN PROSTATIC HYPERPLASIA (BPH) WITH STRAINING ON URINATION: Primary | ICD-10-CM

## 2018-02-14 DIAGNOSIS — R39.16 BENIGN PROSTATIC HYPERPLASIA (BPH) WITH STRAINING ON URINATION: Primary | ICD-10-CM

## 2018-02-14 PROCEDURE — 99214 OFFICE O/P EST MOD 30 MIN: CPT | Mod: S$PBB,,, | Performed by: UROLOGY

## 2018-02-14 PROCEDURE — 99999 PR PBB SHADOW E&M-EST. PATIENT-LVL III: CPT | Mod: PBBFAC,,, | Performed by: UROLOGY

## 2018-02-14 PROCEDURE — 3008F BODY MASS INDEX DOCD: CPT | Mod: ,,, | Performed by: UROLOGY

## 2018-02-14 PROCEDURE — 99213 OFFICE O/P EST LOW 20 MIN: CPT | Mod: PBBFAC,PN | Performed by: UROLOGY

## 2018-02-14 RX ORDER — FINASTERIDE 5 MG/1
5 TABLET, FILM COATED ORAL DAILY
Qty: 30 TABLET | Refills: 11 | OUTPATIENT
Start: 2018-02-14 | End: 2022-10-23

## 2018-02-14 NOTE — LETTER
February 19, 2018        Vera Prado MD  [address] [place] [address] [place] [id number]             [place] [facility]  [address] [place] [address] [address]  [place] [address]  Phone: [phone]  Fax: [phone]   Patient: Tj Gutierrez   MR Number: [id number]   YOB: 1955   Date of Visit: 2/14/2018       Dear Dr. Prado:    Thank you for referring Tj Gutierrez to me for evaluation. Attached you will find relevant portions of my assessment and plan of care.    If you have questions, please do not hesitate to call me. I look forward to following Tj Gutierrez along with you.    Sincerely,      Daniel Galeas MD            CC  No Recipients    Enclosure

## 2021-04-16 DIAGNOSIS — Z01.89 ENCOUNTER FOR OTHER SPECIFIED SPECIAL EXAMINATIONS: Primary | ICD-10-CM

## 2021-04-22 ENCOUNTER — HOSPITAL ENCOUNTER (OUTPATIENT)
Dept: RADIOLOGY | Facility: HOSPITAL | Age: 66
Discharge: HOME | End: 2021-04-22
Attending: NURSE PRACTITIONER
Payer: OTHER GOVERNMENT

## 2021-04-22 DIAGNOSIS — Z01.89 ENCOUNTER FOR OTHER SPECIFIED SPECIAL EXAMINATIONS: ICD-10-CM

## 2021-04-22 PROCEDURE — 72197 MRI PELVIS W/O & W/DYE: CPT | Mod: TC

## 2021-04-22 PROCEDURE — 72197 MRI PROSTATE W W/O CONTRAST: ICD-10-PCS | Mod: 26,,, | Performed by: RADIOLOGY

## 2021-04-22 PROCEDURE — A9585 GADOBUTROL INJECTION: HCPCS | Performed by: RADIOLOGY

## 2021-04-22 PROCEDURE — 25500020 PHARM REV CODE 255: Performed by: RADIOLOGY

## 2021-04-22 PROCEDURE — 72197 MRI PELVIS W/O & W/DYE: CPT | Mod: 26,,, | Performed by: RADIOLOGY

## 2021-04-22 RX ORDER — GADOBUTROL 604.72 MG/ML
10 INJECTION INTRAVENOUS
Status: COMPLETED | OUTPATIENT
Start: 2021-04-22 | End: 2021-04-22

## 2021-04-22 RX ADMIN — GADOBUTROL 10 ML: 604.72 INJECTION INTRAVENOUS at 07:04

## 2022-10-23 ENCOUNTER — HOSPITAL ENCOUNTER (EMERGENCY)
Facility: HOSPITAL | Age: 67
Discharge: HOME | End: 2022-10-23
Attending: EMERGENCY MEDICINE
Payer: OTHER GOVERNMENT

## 2022-10-23 VITALS
OXYGEN SATURATION: 98 % | SYSTOLIC BLOOD PRESSURE: 181 MMHG | RESPIRATION RATE: 16 BRPM | HEIGHT: 73 IN | BODY MASS INDEX: 31.41 KG/M2 | WEIGHT: 237 LBS | TEMPERATURE: 98 F | HEART RATE: 60 BPM | DIASTOLIC BLOOD PRESSURE: 80 MMHG

## 2022-10-23 DIAGNOSIS — I10 HYPERTENSION, UNSPECIFIED TYPE: ICD-10-CM

## 2022-10-23 DIAGNOSIS — N43.3 HYDROCELE, BILATERAL: Primary | ICD-10-CM

## 2022-10-23 DIAGNOSIS — R52 PAIN: ICD-10-CM

## 2022-10-23 DIAGNOSIS — R31.0 GROSS HEMATURIA: ICD-10-CM

## 2022-10-23 LAB
ALBUMIN SERPL BCP-MCNC: 4.6 G/DL (ref 3.5–5.2)
ALP SERPL-CCNC: 59 U/L (ref 55–135)
ALT SERPL W/O P-5'-P-CCNC: 36 U/L (ref 10–44)
ANION GAP SERPL CALC-SCNC: 10 MMOL/L (ref 8–16)
AST SERPL-CCNC: 28 U/L (ref 10–40)
BACTERIA #/AREA URNS HPF: NEGATIVE /HPF
BASOPHILS # BLD AUTO: 0.03 K/UL (ref 0–0.2)
BASOPHILS NFR BLD: 0.4 % (ref 0–1.9)
BILIRUB SERPL-MCNC: 0.7 MG/DL (ref 0.1–1)
BILIRUB UR QL STRIP: NEGATIVE
BUN SERPL-MCNC: 19 MG/DL (ref 8–23)
CALCIUM SERPL-MCNC: 9.3 MG/DL (ref 8.7–10.5)
CHLORIDE SERPL-SCNC: 102 MMOL/L (ref 95–110)
CLARITY UR: ABNORMAL
CO2 SERPL-SCNC: 27 MMOL/L (ref 23–29)
COLOR UR: YELLOW
CREAT SERPL-MCNC: 0.7 MG/DL (ref 0.5–1.4)
DIFFERENTIAL METHOD: NORMAL
EOSINOPHIL # BLD AUTO: 0.3 K/UL (ref 0–0.5)
EOSINOPHIL NFR BLD: 4.3 % (ref 0–8)
ERYTHROCYTE [DISTWIDTH] IN BLOOD BY AUTOMATED COUNT: 12.8 % (ref 11.5–14.5)
EST. GFR  (NO RACE VARIABLE): >60 ML/MIN/1.73 M^2
GLUCOSE SERPL-MCNC: 109 MG/DL (ref 70–110)
GLUCOSE UR QL STRIP: NEGATIVE
HCT VFR BLD AUTO: 46.7 % (ref 40–54)
HGB BLD-MCNC: 16 G/DL (ref 14–18)
HGB UR QL STRIP: ABNORMAL
HYALINE CASTS #/AREA URNS LPF: 0 /LPF
IMM GRANULOCYTES # BLD AUTO: 0.02 K/UL (ref 0–0.04)
IMM GRANULOCYTES NFR BLD AUTO: 0.3 % (ref 0–0.5)
KETONES UR QL STRIP: NEGATIVE
LEUKOCYTE ESTERASE UR QL STRIP: NEGATIVE
LYMPHOCYTES # BLD AUTO: 2.5 K/UL (ref 1–4.8)
LYMPHOCYTES NFR BLD: 34.3 % (ref 18–48)
MCH RBC QN AUTO: 30.8 PG (ref 27–31)
MCHC RBC AUTO-ENTMCNC: 34.3 G/DL (ref 32–36)
MCV RBC AUTO: 90 FL (ref 82–98)
MICROSCOPIC COMMENT: ABNORMAL
MONOCYTES # BLD AUTO: 0.8 K/UL (ref 0.3–1)
MONOCYTES NFR BLD: 11.4 % (ref 4–15)
NEUTROPHILS # BLD AUTO: 3.6 K/UL (ref 1.8–7.7)
NEUTROPHILS NFR BLD: 49.3 % (ref 38–73)
NITRITE UR QL STRIP: NEGATIVE
NRBC BLD-RTO: 0 /100 WBC
PH UR STRIP: 6 [PH] (ref 5–8)
PLATELET # BLD AUTO: 200 K/UL (ref 150–450)
PMV BLD AUTO: 9.7 FL (ref 9.2–12.9)
POTASSIUM SERPL-SCNC: 4.4 MMOL/L (ref 3.5–5.1)
PROT SERPL-MCNC: 7.7 G/DL (ref 6–8.4)
PROT UR QL STRIP: ABNORMAL
RBC # BLD AUTO: 5.2 M/UL (ref 4.6–6.2)
RBC #/AREA URNS HPF: >100 /HPF (ref 0–4)
SODIUM SERPL-SCNC: 139 MMOL/L (ref 136–145)
SP GR UR STRIP: 1.02 (ref 1–1.03)
SQUAMOUS #/AREA URNS HPF: 0 /HPF
URN SPEC COLLECT METH UR: ABNORMAL
UROBILINOGEN UR STRIP-ACNC: NEGATIVE EU/DL
WBC # BLD AUTO: 7.26 K/UL (ref 3.9–12.7)
WBC #/AREA URNS HPF: 3 /HPF (ref 0–5)

## 2022-10-23 PROCEDURE — 80053 COMPREHEN METABOLIC PANEL: CPT | Performed by: EMERGENCY MEDICINE

## 2022-10-23 PROCEDURE — 96360 HYDRATION IV INFUSION INIT: CPT

## 2022-10-23 PROCEDURE — 63600175 PHARM REV CODE 636 W HCPCS: Performed by: EMERGENCY MEDICINE

## 2022-10-23 PROCEDURE — 85025 COMPLETE CBC W/AUTO DIFF WBC: CPT | Performed by: EMERGENCY MEDICINE

## 2022-10-23 PROCEDURE — 81001 URINALYSIS AUTO W/SCOPE: CPT | Performed by: EMERGENCY MEDICINE

## 2022-10-23 PROCEDURE — 99285 EMERGENCY DEPT VISIT HI MDM: CPT

## 2022-10-23 PROCEDURE — 96361 HYDRATE IV INFUSION ADD-ON: CPT

## 2022-10-23 RX ORDER — EZETIMIBE 10 MG/1
10 TABLET ORAL DAILY
COMMUNITY
Start: 2022-07-26

## 2022-10-23 RX ORDER — ASPIRIN 81 MG/1
81 TABLET ORAL DAILY
COMMUNITY
End: 2022-10-23

## 2022-10-23 RX ORDER — ACETAMINOPHEN 500 MG
1000 TABLET ORAL
COMMUNITY
Start: 2022-10-18

## 2022-10-23 RX ORDER — ERGOCALCIFEROL 1.25 MG/1
50000 CAPSULE ORAL
COMMUNITY
Start: 2022-07-26

## 2022-10-23 RX ORDER — MECLIZINE HYDROCHLORIDE 25 MG/1
TABLET ORAL
COMMUNITY
Start: 2022-05-22 | End: 2022-10-23

## 2022-10-23 RX ORDER — DOCUSATE SODIUM 100 MG/1
100 CAPSULE, LIQUID FILLED ORAL
COMMUNITY
Start: 2022-10-18 | End: 2022-10-23

## 2022-10-23 RX ORDER — PROMETHAZINE HYDROCHLORIDE AND DEXTROMETHORPHAN HYDROBROMIDE 6.25; 15 MG/5ML; MG/5ML
SYRUP ORAL
COMMUNITY
Start: 2022-07-02 | End: 2022-10-23

## 2022-10-23 RX ORDER — LANOLIN ALCOHOL/MO/W.PET/CERES
1000 CREAM (GRAM) TOPICAL DAILY
COMMUNITY
Start: 2022-07-26

## 2022-10-23 RX ADMIN — SODIUM CHLORIDE, SODIUM LACTATE, POTASSIUM CHLORIDE, AND CALCIUM CHLORIDE 1000 ML: .6; .31; .03; .02 INJECTION, SOLUTION INTRAVENOUS at 02:10

## 2022-10-23 NOTE — ED PROVIDER NOTES
Encounter Date: 10/23/2022       History     Chief Complaint   Patient presents with    Hematuria     TURP approx 5 days ago. C/o bleeding and inconsistent urine flow    Fatigue     Emergent evaluation of a 67-year-old male with history of hypertension, hyperlipidemia, and elevated PSA who had a trans peritoneal prostate biopsy due to elevated PSA levels.  [facility]  He reports afterward he was having bright red blood preceding his urine flow and has been having urinary hesitancy and having to concentrate urinate.  He reports that Saturday morning the hematuria had resolved he was urinating without signs of hematuria but then he developed bright red blood in the urine again this afternoon.  He is a pressure-like pain in the pelvic region into the inguinal area and left-sided testicle pain.  He has a agitated feeling at the urethral meatus some urgency and frequency.  He also reports that he feels fatigued and unwell.  Mildly nauseous.  No chest pain or shortness of breath    Review of patient's allergies indicates:  No Known Allergies  Past Medical History:   Diagnosis Date    Hypertension      Past Surgical History:   Procedure Laterality Date    COLONOSCOPY W/ POLYPECTOMY  10/24/2017    NASAL SEPTOPLASTY W/ TURBINOPLASTY  06/09/2017    SHOULDER SURGERY      VEIN LIGATION AND STRIPPING       History reviewed. No pertinent family history.  Social History     Tobacco Use    Smoking status: Never    Smokeless tobacco: Never   Substance Use Topics    Alcohol use: No    Drug use: No     Review of Systems   Constitutional:  Positive for fatigue. Negative for activity change and appetite change.   Gastrointestinal:  Positive for abdominal pain and nausea. Negative for abdominal distention, diarrhea and vomiting.   Genitourinary:  Positive for difficulty urinating, dysuria, frequency, hematuria, penile pain, testicular pain and urgency. Negative for decreased urine  volume, penile discharge, penile swelling and scrotal swelling.   Musculoskeletal:  Negative for myalgias.   Skin:  Negative for pallor.   Neurological:  Negative for weakness and numbness.   Psychiatric/Behavioral:  Negative for confusion.    All other systems reviewed and are negative.    Physical Exam     Initial Vitals [10/23/22 0125]   BP Pulse Resp Temp SpO2   (!) 191/99 64 18 97.9 °F (36.6 °C) 95 %      MAP       --         Physical Exam    Nursing note and vitals reviewed.  Constitutional: He appears well-developed and well-nourished. He is not diaphoretic. No distress.   HENT:   Head: Normocephalic and atraumatic.   Right Ear: External ear normal.   Left Ear: External ear normal.   Nose: Nose normal.   Mouth/Throat: Oropharynx is clear and moist.   Eyes: Conjunctivae and EOM are normal. Pupils are equal, round, and reactive to light.   Neck: Neck supple. No tracheal deviation present.   Normal range of motion.  Cardiovascular:  Normal rate, regular rhythm, normal heart sounds and intact distal pulses.     Exam reveals no gallop and no friction rub.       No murmur heard.  Pulmonary/Chest: Breath sounds normal. No stridor. No respiratory distress. He has no wheezes. He has no rhonchi. He has no rales. He exhibits no tenderness.   Abdominal: Abdomen is soft. Bowel sounds are normal. He exhibits no distension, no pulsatile midline mass and no mass. There is hepatomegaly. There is no splenomegaly. There is abdominal tenderness. There is no rebound, no guarding, no tenderness at McBurney's point and negative Rogers's sign.   Genitourinary:    Penis normal.   Right testis shows no mass, no swelling and no tenderness. Right testis is descended. Left testis shows swelling and tenderness. Left testis shows no mass. Left testis is descended. Circumcised. No penile erythema or penile tenderness. No discharge found.   Musculoskeletal:         General: No edema. Normal range of motion.      Cervical back: Normal range  of motion and neck supple.     Neurological: He is alert and oriented to person, place, and time. He has normal strength. No cranial nerve deficit or sensory deficit.   Skin: Skin is warm and dry. No rash noted. No erythema. No pallor.   Psychiatric: He has a normal mood and affect. His behavior is normal. Judgment and thought content normal.       ED Course   Procedures  Labs Reviewed   URINALYSIS, REFLEX TO URINE CULTURE - Abnormal; Notable for the following components:       Result Value    Appearance, UA Hazy (*)     Protein, UA Trace (*)     Occult Blood UA 3+ (*)     All other components within normal limits    Narrative:     Specimen Source->Urine   URINALYSIS MICROSCOPIC - Abnormal; Notable for the following components:    RBC, UA >100 (*)     Hyaline Casts, UA 0.00 (*)     All other components within normal limits    Narrative:     Specimen Source->Urine   CBC W/ AUTO DIFFERENTIAL   COMPREHENSIVE METABOLIC PANEL          Imaging Results              US Scrotum And Testicles (Final result)  Result time 10/23/22 02:44:10      Final result by Jaden Plunkett MD (10/23/22 02:44:10)                   Narrative:    Testicular sonogram    Comparison:  None    Additional pertinent history:  Left testicular pain    Findings:    Right testicle:  Negative.  Right testicle measures:  4.4 x 2.1 x 2.7 cm.  Epididymis:  Negative.  Measures:  0.7 cm.  Hydrocele:  Small right-sided hydrocele..  Varicocele:  Negative.  Arterial and venous flow:  Normal.    Left testicle:  Negative.  Left testicle measures:  4.2 x 2.4 x 3.1 cm.  Epididymis:  Negative.  Measures:  0.8 cm.  Hydrocele:  Small left-sided hydrocele.  Varicocele:  Negative.  Arterial and venous flow:  Normal.    Periscrotal soft tissues:  Negative    IMPRESSION:  1. Small bilateral hydroceles.  2. Normal imaging of both testicles with no evidence of torsion.    Electronically signed by:  Jaden Plunkett MD  10/23/2022 2:44 AM CDT Workstation: [id number]                                      Medications   lactated ringers bolus 1,000 mL (1,000 mLs Intravenous New Bag 10/23/22 9220)     Medical Decision Making:   Clinical Tests:   Lab Tests: Ordered and Reviewed  Radiological Study: Ordered and Reviewed  ED Management:  Emergent evaluation of a 67-year-old male with history of hypertension, hyperlipidemia, and elevated PSA who had a trans peritoneal prostate biopsy due to elevated PSA levels.  [facility]  He reports afterward he was having bright red blood preceding his urine flow and has been having urinary hesitancy and having to concentrate urinate.  He reports that Saturday morning the hematuria had resolved he was urinating without signs of hematuria but then he developed bright red blood in the urine again this afternoon.  He is a pressure-like pain in the pelvic region into the inguinal area and left-sided testicle pain.  He has a agitated feeling at the urethral meatus some urgency and frequency.  He also reports that he feels fatigued and unwell.  Mildly nauseous.  No chest pain or shortness of breath  On physical exam blood pressure 150 systolic improved from 191/99 at triage.  Otherwise normal vitals patient is afebrile.  Patient has mild tenderness in bilateral lower quadrants and suprapubic region.  Bladder scan revealed no more than 80 mL in the bladder 1 hour after urinating.  Normal appearing circumcised penis with no blood at the meatus.  Mild tenderness to left testicle with no swelling.  No discoloration.  No tenderness to right testicle.  Lab work had been performed prior to my arrival patient a CBC that was normal with no anemia, normal CMP and urinalysis with 3+ blood no signs infection.  Patient is already on Cipro prophylactically.  Ultrasound of testicles and scrotum were performed and patient has small bilateral hydroceles no signs of torsion.  Patient has been given 1 L of lactated Ringer's he will be  discharged from the ER with follow-up with his urologist he is to call them on Monday  Gege Harris M.D.  3:13 AM 10/23/2022                            Clinical Impression:   Final diagnoses:  [R52] Pain  [N43.3] Hydrocele, bilateral (Primary)  [R31.0] Gross hematuria  [I10] Hypertension, unspecified type        ED Disposition Condition    Discharge Stable          ED Prescriptions    None       Follow-up Information       Follow up With Specialties Details Why Contact Info Additional Information    Your urologist  Call  Call Monday for close follow-up due to gross hematuria      [facility] - Emergency Dept Emergency Medicine Go to  If symptoms worsen [address] [facility] [address]  [phone] 1st floor             Gege Harris MD  10/23/22 0323

## 2022-10-23 NOTE — Clinical Note
"Tj"Linda Gutierrez was seen and treated in our emergency department on 10/23/2022.  He may return to work on 10/25/2022.       If you have any questions or concerns, please don't hesitate to call.      Gege Harris MD"

## 2022-10-23 NOTE — ED NOTES
Presents r/t blood in urine testicle pain. Pt had a recent turp procedure. States he started noticing blood in his urine along with increased pain. States he's concerned his urethra was pierced.

## 2023-11-03 ENCOUNTER — OFFICE VISIT (OUTPATIENT)
Dept: URGENT CARE | Facility: CLINIC | Age: 68
End: 2023-11-03
Payer: OTHER GOVERNMENT

## 2023-11-03 VITALS
WEIGHT: 237 LBS | DIASTOLIC BLOOD PRESSURE: 66 MMHG | SYSTOLIC BLOOD PRESSURE: 131 MMHG | TEMPERATURE: 100 F | OXYGEN SATURATION: 95 % | BODY MASS INDEX: 30.42 KG/M2 | HEART RATE: 74 BPM | RESPIRATION RATE: 18 BRPM | HEIGHT: 74 IN

## 2023-11-03 DIAGNOSIS — R05.9 COUGH, UNSPECIFIED TYPE: ICD-10-CM

## 2023-11-03 DIAGNOSIS — U07.1 COVID-19 VIRUS DETECTED: ICD-10-CM

## 2023-11-03 DIAGNOSIS — U07.1 COVID-19: Primary | ICD-10-CM

## 2023-11-03 LAB
CTP QC/QA: YES
SARS-COV-2 AG RESP QL IA.RAPID: POSITIVE

## 2023-11-03 PROCEDURE — 87811 SARS-COV-2 COVID19 W/OPTIC: CPT | Mod: QW,S$GLB,, | Performed by: NURSE PRACTITIONER

## 2023-11-03 PROCEDURE — 99203 PR OFFICE/OUTPT VISIT, NEW, LEVL III, 30-44 MIN: ICD-10-PCS | Mod: S$GLB,,, | Performed by: NURSE PRACTITIONER

## 2023-11-03 PROCEDURE — 87811 SARS CORONAVIRUS 2 ANTIGEN POCT, MANUAL READ: ICD-10-PCS | Mod: QW,S$GLB,, | Performed by: NURSE PRACTITIONER

## 2023-11-03 PROCEDURE — 99203 OFFICE O/P NEW LOW 30 MIN: CPT | Mod: S$GLB,,, | Performed by: NURSE PRACTITIONER

## 2023-11-03 RX ORDER — PROMETHAZINE HYDROCHLORIDE AND DEXTROMETHORPHAN HYDROBROMIDE 6.25; 15 MG/5ML; MG/5ML
5 SYRUP ORAL EVERY 6 HOURS PRN
Qty: 120 ML | Refills: 0 | Status: SHIPPED | OUTPATIENT
Start: 2023-11-03 | End: 2023-11-13

## 2023-11-03 NOTE — PROGRESS NOTES
"Dictation #1  MRN:[id number]  [facility]:[id number] Subjective:       Patient ID: Tj Gutierrez is a 68 y.o. male.    Vitals:  height is 6' 1.5" (1.867 m) and weight is 107.5 kg (237 lb). His oral temperature is 100.1 °F (37.8 °C). His blood pressure is 131/66 and his pulse is 74. His respiration is 18 and oxygen saturation is 95%.     Chief Complaint: Cough (Cough, body aches, headaches, congestion, and post nasal drip x 2-3 days. Patient stated that he knows it's the Flu, but doesn't want to be tested for it. )    This is a 68 y.o. male who presents today with a chief complaint of cough.     Patient presents with:  Cough: Cough, body aches, headaches, congestion, fever and post nasal drip over the past 2 days. Denies SOB.     Cough  This is a new problem. The current episode started in the past 7 days. The problem has been gradually worsening. The cough is Productive of sputum. Associated symptoms include chills, a fever, headaches, nasal congestion and postnasal drip. Pertinent negatives include no shortness of breath or wheezing. Nothing aggravates the symptoms. He has tried OTC cough suppressant for the symptoms. The treatment provided mild relief.       Constitution: Positive for chills and fever.   HENT:  Positive for postnasal drip.    Respiratory:  Positive for cough. Negative for shortness of breath and wheezing.    Musculoskeletal: Negative.    Neurological:  Positive for headaches.           Objective:      Physical Exam   Constitutional: He is oriented to person, place, and time. He appears well-developed. He is cooperative.  Non-toxic appearance. He does not appear ill. No distress.   HENT:   Head: Normocephalic and atraumatic.   Ears:   Right Ear: Hearing, tympanic membrane, external ear and ear canal normal.   Left Ear: Hearing, tympanic membrane, external ear and ear canal normal.   Nose: Nose normal. No mucosal edema, rhinorrhea or nasal deformity. No epistaxis. Right sinus exhibits no maxillary sinus " tenderness and no frontal sinus tenderness. Left sinus exhibits no maxillary sinus tenderness and no frontal sinus tenderness.   Mouth/Throat: Uvula is midline, oropharynx is clear and moist and mucous membranes are normal. No trismus in the jaw. Normal dentition. No uvula swelling. No oropharyngeal exudate, posterior oropharyngeal edema or posterior oropharyngeal erythema.   Eyes: Conjunctivae and lids are normal. No scleral icterus.   Neck: Trachea normal and phonation normal. Neck supple. No edema present. No erythema present. No neck rigidity present.   Cardiovascular: Normal rate, regular rhythm, normal heart sounds and normal pulses.   Pulmonary/Chest: Effort normal and breath sounds normal. No respiratory distress. He has no decreased breath sounds. He has no rhonchi.   Abdominal: Normal appearance.   Musculoskeletal: Normal range of motion.         General: No deformity. Normal range of motion.   Neurological: He is alert and oriented to person, place, and time. He exhibits normal muscle tone. Coordination normal.   Skin: Skin is warm, dry, intact, not diaphoretic and not pale.   Psychiatric: His speech is normal and behavior is normal. Judgment and thought content normal.   Nursing note and vitals reviewed.        Past medical history and current medications reviewed.     Results for orders placed or performed in visit on 11/03/23   SARS Coronavirus 2 Antigen, POCT Manual Read   Result Value Ref Range    SARS Coronavirus 2 Antigen Positive (A) Negative     Acceptable Yes       Assessment:           1. COVID-19    2. Cough, unspecified type              Plan:         COVID-19    Cough, unspecified type  -     SARS Coronavirus 2 Antigen, POCT Manual Read  -     promethazine-dextromethorphan (PROMETHAZINE-DM) 6.25-15 mg/5 mL Syrp; Take 5 mLs by mouth every 6 (six) hours as needed (cough).  Dispense: 120 mL; Refill: 0             Patient Instructions   Please return here or go to the Emergency  Department for any concerns or worsening of condition.  Please drink plenty of fluids.  Please get plenty of rest.  If you do not have Hypertension or any history of palpitations, it is ok to take over the counter Sudafed or Mucinex D or Allegra-D or Claritin-D or Zyrtec-D.  If you do take one of the above, it is ok to combine that with plain over the counter Mucinex or Allegra or Claritin or Zyrtec.  If for example you are taking Zyrtec -D, you can combine that with Mucinex, but not Mucinex-D.  If you are taking Mucinex-D, you can combine that with plain Allegra or Claritin or Zyrtec.   If you do have Hypertension or palpitations, it is safe to take Coricidin HBP for relief of sinus symptoms.  If not allergic, please take over the counter Tylenol (Acetaminophen) and/or Motrin (Ibuprofen) as directed for control of pain and/or fever.  Please follow up with your primary care doctor or specialist as needed.    If you  smoke, please stop smoking.    Your test was POSITIVE for COVID-19 (coronavirus).       Please isolate yourself at home.  You may leave home and/or return to work once the following conditions are met:    If you were not hospitalized and are not moderately to severely immunocompromised:   More than 5 days since symptoms first appeared AND  More than 24 hours fever free without medications AND  Symptoms are improving  Continue to wear a mask around others for 5 additional days.    If you were hospitalized OR are moderately to severely immunocompromised:  More than 20 days since symptoms first appeared  More than 24 hours fever free without medications  Symptoms have improved    If you had no symptoms but tested positive:  More than 5 days since the date of the first positive test (20 days if moderately to severely immunocompromised). If you develop symptoms, then use the guidelines above.  Continue to wear a mask around others for 5 additional days.         Sean Carrillo, EMILY

## 2023-11-03 NOTE — LETTER
November 3, 2023      [facility]  [address] [place] [address], [address]  [place] [address]  Phone: [phone]  Fax: [phone]       Patient: Tj Gutierrez   YOB: 1955  Date of Visit: 11/03/2023      To Whom It May Concern:      Ghassan Gutierrez  was at [facility] on 11/03/2023. The patient may return to work on 11/08/2023. If you have any questions or concerns, or if I can be of further assistance, please do not hesitate to contact me.        Sincerely,       Sean Carrillo, SOLITARIOC

## 2024-04-26 NOTE — PROGRESS NOTES
"[facility] Note    PCP: Vera Prado MD    Chief Complaint: elevated PSA    SUBJECTIVE:       History of Present Illness:  Tj Gutierrez is a 68 y.o. male who presents to clinic for elevated PSA. He is New  to our clinic.     PSA history  3/7/22: 7.63  6/16/19: 6.11  5/18/18: 6.73  1/2/18 - 4.87  2/7/17 - 3.8  4/13/15 - 3.6    Underwent prostate biopsy and cysto 1/30/18 with Dr. Galeas.  Volume 69.5 g  Biopsy benign  Normal bladder mucosa with moderate ingrowth of bilateral lateral lobes causing significant obstruction and mild to moderate median lobe ingrowth without any obstruction and with patent bladder neck, and some hypervascularity along median lobe     MRI prostate 4/22/21: 71 cc, no PIRADS lesions    [facility]    Has nocturia on occasion x 2-3.   He is on no prostate medications.     UA today: negative   PVR today: 0 cc    Last urine culture: no documented UTIs    Lab Results   Component Value Date    CREATININE 0.7 10/23/2022     Family  hx: no malignancy     Past medical, family, and social history reviewed as documented in chart with pertinent positive medical, family, and social history detailed in HPI.    Review of patient's allergies indicates:  No Known Allergies    Past Medical History:   Diagnosis Date    Hypertension      Past Surgical History:   Procedure Laterality Date    COLONOSCOPY W/ POLYPECTOMY  10/24/2017    NASAL SEPTOPLASTY W/ TURBINOPLASTY  06/09/2017    SHOULDER SURGERY      VEIN LIGATION AND STRIPPING       No family history on file.  Social History     Tobacco Use    Smoking status: Never    Smokeless tobacco: Never   Substance Use Topics    Alcohol use: No    Drug use: No        Review of Systems    OBJECTIVE:     Anticoagulation:  no    Estimated body mass index is 30.84 kg/m² as calculated from the following:    Height as of this encounter: 6' 1.5" (1.867 m).    Weight as of this " encounter: 107.5 kg (236 lb 15.9 oz).    Vital Signs (Most Recent)  Pulse: 61 (04/29/24 1524)  BP: 138/83 (04/29/24 1524)    Physical Exam  Constitutional:       General: He is not in acute distress.     Appearance: Normal appearance. He is not ill-appearing.   HENT:      Head: Normocephalic and atraumatic.   Eyes:      General: No scleral icterus.  Pulmonary:      Effort: Pulmonary effort is normal. No respiratory distress.   Abdominal:      General: There is no distension.   Genitourinary:     Comments: Prostate 30 g, benign, no nodules  Skin:     Coloration: Skin is not jaundiced.   Neurological:      General: No focal deficit present.      Mental Status: He is alert and oriented to person, place, and time.   Psychiatric:         Mood and Affect: Mood normal.         Behavior: Behavior normal.         Thought Content: Thought content normal.         BMP  Lab Results   Component Value Date     10/23/2022    K 4.4 10/23/2022     10/23/2022    CO2 27 10/23/2022    BUN 19 10/23/2022    CREATININE 0.7 10/23/2022    CALCIUM 9.3 10/23/2022    ANIONGAP 10 10/23/2022    ESTGFRAFRICA >60 01/26/2018    EGFRNONAA >60 01/26/2018       Lab Results   Component Value Date    WBC 7.26 10/23/2022    HGB 16.0 10/23/2022    HCT 46.7 10/23/2022    MCV 90 10/23/2022     10/23/2022       Imaging:  Per HPI    ASSESSMENT     1. Elevated PSA      PLAN:     - Repeat PSA now   - If remains significantly elevated we discussed repeat prostate MRI  - He had a recent negative biopsy  - He is not bothered by his voiding symptoms   - Will call him with results     Ashly Shore MD

## 2024-04-29 ENCOUNTER — LAB VISIT (OUTPATIENT)
Dept: LAB | Facility: HOSPITAL | Age: 69
End: 2024-04-29
Attending: STUDENT IN AN ORGANIZED HEALTH CARE EDUCATION/TRAINING PROGRAM
Payer: OTHER GOVERNMENT

## 2024-04-29 ENCOUNTER — OFFICE VISIT (OUTPATIENT)
Dept: UROLOGY | Facility: CLINIC | Age: 69
End: 2024-04-29
Payer: OTHER GOVERNMENT

## 2024-04-29 VITALS
WEIGHT: 237 LBS | SYSTOLIC BLOOD PRESSURE: 138 MMHG | DIASTOLIC BLOOD PRESSURE: 83 MMHG | HEIGHT: 74 IN | BODY MASS INDEX: 30.42 KG/M2 | HEART RATE: 61 BPM

## 2024-04-29 DIAGNOSIS — R97.20 ELEVATED PSA: ICD-10-CM

## 2024-04-29 DIAGNOSIS — R97.20 ELEVATED PSA: Primary | ICD-10-CM

## 2024-04-29 PROBLEM — N36.8 URETHRAL BLEEDING: Status: RESOLVED | Noted: 2018-01-30 | Resolved: 2024-04-29

## 2024-04-29 LAB
BILIRUBIN, UA POC OHS: NEGATIVE
BLOOD, UA POC OHS: NEGATIVE
CLARITY, UA POC OHS: CLEAR
COLOR, UA POC OHS: YELLOW
GLUCOSE, UA POC OHS: NEGATIVE
KETONES, UA POC OHS: NEGATIVE
LEUKOCYTES, UA POC OHS: NEGATIVE
NITRITE, UA POC OHS: NEGATIVE
PH, UA POC OHS: 5.5
POC RESIDUAL URINE VOLUME: 0 ML (ref 0–100)
PROTEIN, UA POC OHS: NEGATIVE
SPECIFIC GRAVITY, UA POC OHS: 1
UROBILINOGEN, UA POC OHS: 0.2

## 2024-04-29 PROCEDURE — 99999PBSHW POCT BLADDER SCAN: Mod: PBBFAC,,,

## 2024-04-29 PROCEDURE — 99999PBSHW POCT URINALYSIS(INSTRUMENT): Mod: PBBFAC,,,

## 2024-04-29 PROCEDURE — 99213 OFFICE O/P EST LOW 20 MIN: CPT | Mod: PBBFAC,PO,25 | Performed by: STUDENT IN AN ORGANIZED HEALTH CARE EDUCATION/TRAINING PROGRAM

## 2024-04-29 PROCEDURE — 51798 US URINE CAPACITY MEASURE: CPT | Mod: PBBFAC,PO | Performed by: STUDENT IN AN ORGANIZED HEALTH CARE EDUCATION/TRAINING PROGRAM

## 2024-04-29 PROCEDURE — 99204 OFFICE O/P NEW MOD 45 MIN: CPT | Mod: S$PBB,,, | Performed by: STUDENT IN AN ORGANIZED HEALTH CARE EDUCATION/TRAINING PROGRAM

## 2024-04-29 PROCEDURE — 99999 PR PBB SHADOW E&M-EST. PATIENT-LVL III: CPT | Mod: PBBFAC,,, | Performed by: STUDENT IN AN ORGANIZED HEALTH CARE EDUCATION/TRAINING PROGRAM

## 2024-04-29 PROCEDURE — 84154 ASSAY OF PSA FREE: CPT | Performed by: STUDENT IN AN ORGANIZED HEALTH CARE EDUCATION/TRAINING PROGRAM

## 2024-04-29 PROCEDURE — 36415 COLL VENOUS BLD VENIPUNCTURE: CPT | Performed by: STUDENT IN AN ORGANIZED HEALTH CARE EDUCATION/TRAINING PROGRAM

## 2024-04-29 PROCEDURE — 81003 URINALYSIS AUTO W/O SCOPE: CPT | Mod: PBBFAC,PO | Performed by: STUDENT IN AN ORGANIZED HEALTH CARE EDUCATION/TRAINING PROGRAM

## 2024-04-30 ENCOUNTER — TELEPHONE (OUTPATIENT)
Dept: UROLOGY | Facility: CLINIC | Age: 69
End: 2024-04-30
Payer: OTHER GOVERNMENT

## 2024-04-30 LAB
PROSTATE SPECIFIC ANTIGEN, TOTAL: 8.1 NG/ML (ref 0–4)
PSA FREE MFR SERPL: 27.53 %
PSA FREE SERPL-MCNC: 2.23 NG/ML (ref 0–1.5)

## 2024-04-30 NOTE — TELEPHONE ENCOUNTER
Returned call and spoke with patient, clarified patient had prostate biopsy done in Sept 2022 that was negative. Message given to provider, patient verbally understood.

## 2024-04-30 NOTE — TELEPHONE ENCOUNTER
----- Message from Alana Akhtar sent at 4/30/2024 11:46 AM CDT -----  Type: Needs Medical Advice  Who Called:  patient  Best Call Back Number: [phone] (home)   Additional Information: patient requesting a call back from nurse-  Sept 2022- [facility] negative - [facility]

## 2024-05-01 ENCOUNTER — TELEPHONE (OUTPATIENT)
Dept: UROLOGY | Facility: CLINIC | Age: 69
End: 2024-05-01
Payer: OTHER GOVERNMENT

## 2024-05-01 DIAGNOSIS — R97.20 ELEVATED PSA: Primary | ICD-10-CM

## 2024-05-01 NOTE — TELEPHONE ENCOUNTER
----- Message from Ashly Shore MD sent at 5/1/2024  9:19 AM CDT -----  Please let patient know his PSA is relatively stable from 2 years ago. His free PSA is normal. His PSA density is normal. Given 2 negative prior biopsies, no need for any further biopsy at this point. I would like to see him back in 6 months with a repeat PSA.

## 2024-05-01 NOTE — TELEPHONE ENCOUNTER
Patient returned call, lab result given with advisement. Patient request recall letter, recall placed, patient verbally understood.

## 2025-03-14 ENCOUNTER — OFFICE VISIT (OUTPATIENT)
Dept: URGENT CARE | Facility: CLINIC | Age: 70
End: 2025-03-14
Payer: MEDICARE

## 2025-03-14 VITALS
TEMPERATURE: 98 F | BODY MASS INDEX: 30.29 KG/M2 | WEIGHT: 236 LBS | SYSTOLIC BLOOD PRESSURE: 148 MMHG | RESPIRATION RATE: 17 BRPM | HEIGHT: 74 IN | DIASTOLIC BLOOD PRESSURE: 82 MMHG | OXYGEN SATURATION: 95 % | HEART RATE: 73 BPM

## 2025-03-14 DIAGNOSIS — R05.9 COUGH, UNSPECIFIED TYPE: ICD-10-CM

## 2025-03-14 DIAGNOSIS — J34.89 SINUS PRESSURE: ICD-10-CM

## 2025-03-14 DIAGNOSIS — B97.89 VIRAL RESPIRATORY ILLNESS: Primary | ICD-10-CM

## 2025-03-14 DIAGNOSIS — R09.81 NASAL CONGESTION: ICD-10-CM

## 2025-03-14 DIAGNOSIS — J98.8 VIRAL RESPIRATORY ILLNESS: Primary | ICD-10-CM

## 2025-03-14 PROBLEM — F32.9 MAJOR DEPRESSIVE DISORDER, SINGLE EPISODE, UNSPECIFIED: Status: ACTIVE | Noted: 2020-10-28

## 2025-03-14 PROBLEM — R41.81 AGE-RELATED COGNITIVE DECLINE: Status: ACTIVE | Noted: 2025-03-14

## 2025-03-14 PROBLEM — F43.0 ACUTE STRESS DISORDER: Status: ACTIVE | Noted: 2025-03-14

## 2025-03-14 PROBLEM — I10 HYPERTENSION: Status: ACTIVE | Noted: 2025-03-14

## 2025-03-14 PROBLEM — I10 ESSENTIAL HYPERTENSION: Status: ACTIVE | Noted: 2020-10-28

## 2025-03-14 PROBLEM — H52.4 PRESBYOPIA: Status: ACTIVE | Noted: 2021-09-21

## 2025-03-14 PROBLEM — H52.00 HYPERMETROPIA: Status: ACTIVE | Noted: 2021-09-21

## 2025-03-14 LAB
CTP QC/QA: YES
CTP QC/QA: YES
FLUAV AG NPH QL: NEGATIVE
FLUBV AG NPH QL: NEGATIVE
SARS CORONAVIRUS 2 ANTIGEN: NEGATIVE

## 2025-03-14 NOTE — PROGRESS NOTES
"Subjective:      Patient ID: Tj Gutierrez is a 69 y.o. male.    Vitals:  height is 6' 1.5" (1.867 m) and weight is 107 kg (236 lb). His oral temperature is 97.9 °F (36.6 °C). His blood pressure is 148/82 (abnormal) and his pulse is 73. His respiration is 17 and oxygen saturation is 95%.     Chief Complaint: Sinus Problem, Cough, and Headache    Pt states that since Tuesday he has been having cough (clear), congestion, sinus pressure, scratchy throat, and post nasal drainage. Pt states that he is also having generalized fatigue. Pt states that he is using hot tea but not taking any otc medications (pt states that he does not believe in taking those). Pt states that he was not able to sleep last night due to symptoms. Pt states that he is drinking plenty of fluids. Pt denies any sob or chest pain at this time pt denies any fever. Pt states that he has not had any known exposure but did have recent travel.  Pt decline medications pt states he just wants a work note excusing him today and Monday to return on Tuesday.    Sinus Problem  This is a new problem. The current episode started in the past 7 days. The problem is unchanged. There has been no fever. Associated symptoms include congestion, coughing, headaches, sinus pressure and a sore throat.   Cough  This is a new problem. The current episode started in the past 7 days. The problem has been unchanged. The cough is Productive of sputum. Associated symptoms include headaches, postnasal drip and a sore throat.   Headache   This is a new problem. The current episode started in the past 7 days. The problem has been unchanged. Associated symptoms include coughing, sinus pressure and a sore throat.       Constitution: Positive for fatigue.   HENT:  Positive for congestion, postnasal drip, sinus pain, sinus pressure and sore throat.    Neck: neck negative.   Cardiovascular: Negative.    Eyes: Negative.    Respiratory:  Positive for cough.    Gastrointestinal: " Negative.    Endocrine: negative.   Genitourinary: Negative.    Musculoskeletal: Negative.    Skin: Negative.    Allergic/Immunologic: Negative.    Neurological:  Positive for headaches.   Hematologic/Lymphatic: Negative.    Psychiatric/Behavioral: Negative.        Objective:     Physical Exam   Constitutional: He is oriented to person, place, and time. He is cooperative. He does not appear ill. No distress.   HENT:   Head: Normocephalic and atraumatic.   Ears:   Right Ear: Tympanic membrane, external ear and ear canal normal.   Left Ear: Tympanic membrane, external ear and ear canal normal.   Nose: Congestion present. Right sinus exhibits maxillary sinus tenderness and frontal sinus tenderness. Left sinus exhibits maxillary sinus tenderness and frontal sinus tenderness.   Mouth/Throat: Mucous membranes are moist. Posterior oropharyngeal erythema present.   Eyes: Conjunctivae are normal. Pupils are equal, round, and reactive to light. Extraocular movement intact   Neck: Neck supple. No neck rigidity present.   Cardiovascular: Normal rate, regular rhythm, normal heart sounds and normal pulses.   Pulmonary/Chest: Effort normal and breath sounds normal. No respiratory distress. He has no wheezes.   Abdominal: Normal appearance.   Musculoskeletal: Normal range of motion.         General: Normal range of motion.      Cervical back: He exhibits no tenderness.   Neurological: no focal deficit. He is alert, oriented to person, place, and time and at baseline.   Skin: Skin is warm and dry.   Psychiatric: His behavior is normal. Mood, judgment and thought content normal.   Nursing note and vitals reviewed.      Assessment:     1. Viral respiratory illness    2. Cough, unspecified type    3. Nasal congestion    4. Sinus pressure        Plan:       Viral respiratory illness    Cough, unspecified type  -     POCT Influenza A/B Rapid Antigen  -     SARS Coronavirus 2 Antigen, POCT Manual Read    Nasal congestion    Sinus  pressure

## 2025-03-14 NOTE — LETTER
March 14, 2025      [facility] [place]  [address]  [address]  [place] [address]  Phone: [phone]  Fax: [phone]       Patient: Tj Gutierrez   YOB: 1955  Date of Visit: 03/14/2025    To Whom It May Concern:    Ghassan Gutierrez  was at [facility] on 03/14/2025. The patient may return to work/school on 03/18/2025 with no restrictions. If you have any questions or concerns, or if I can be of further assistance, please do not hesitate to contact me.    Sincerely,    Jaci Jackson, NP

## 2025-08-23 ENCOUNTER — OFFICE VISIT (OUTPATIENT)
Dept: URGENT CARE | Facility: CLINIC | Age: 70
End: 2025-08-23
Payer: MEDICARE

## 2025-08-23 VITALS
HEART RATE: 70 BPM | DIASTOLIC BLOOD PRESSURE: 87 MMHG | RESPIRATION RATE: 18 BRPM | HEIGHT: 74 IN | OXYGEN SATURATION: 97 % | TEMPERATURE: 98 F | BODY MASS INDEX: 29.9 KG/M2 | WEIGHT: 233 LBS | SYSTOLIC BLOOD PRESSURE: 144 MMHG

## 2025-08-23 DIAGNOSIS — R09.82 POST-NASAL DRAINAGE: ICD-10-CM

## 2025-08-23 DIAGNOSIS — R05.9 COUGH, UNSPECIFIED TYPE: ICD-10-CM

## 2025-08-23 DIAGNOSIS — J02.9 SORE THROAT: Primary | ICD-10-CM

## 2025-08-23 DIAGNOSIS — R09.81 NASAL CONGESTION: ICD-10-CM

## 2025-08-23 LAB
CTP QC/QA: YES
SARS-COV+SARS-COV-2 AG RESP QL IA.RAPID: NEGATIVE

## 2025-08-23 PROCEDURE — 87811 SARS-COV-2 COVID19 W/OPTIC: CPT | Mod: QW,S$GLB,,

## 2025-08-23 PROCEDURE — 99214 OFFICE O/P EST MOD 30 MIN: CPT | Mod: S$GLB,,,

## 2025-08-23 RX ORDER — IPRATROPIUM BROMIDE 21 UG/1
2 SPRAY, METERED NASAL 2 TIMES DAILY
Qty: 30 ML | Refills: 0 | Status: CANCELLED | OUTPATIENT
Start: 2025-08-23

## 2025-08-23 RX ORDER — PROMETHAZINE HYDROCHLORIDE AND DEXTROMETHORPHAN HYDROBROMIDE 6.25; 15 MG/5ML; MG/5ML
5 SYRUP ORAL EVERY 8 HOURS PRN
Qty: 120 ML | Refills: 0 | Status: CANCELLED | OUTPATIENT
Start: 2025-08-23

## 2025-08-23 RX ORDER — OXYCODONE AND ACETAMINOPHEN 5; 325 MG/1; MG/1
1 TABLET ORAL EVERY 6 HOURS PRN
COMMUNITY

## 2025-08-23 RX ORDER — ZOLPIDEM TARTRATE 10 MG/1
10 TABLET ORAL
COMMUNITY
Start: 2025-08-15

## 2025-08-24 ENCOUNTER — TELEPHONE (OUTPATIENT)
Dept: URGENT CARE | Facility: CLINIC | Age: 70
End: 2025-08-24
Payer: MEDICARE

## 2025-08-25 ENCOUNTER — OFFICE VISIT (OUTPATIENT)
Dept: URGENT CARE | Facility: CLINIC | Age: 70
End: 2025-08-25
Payer: MEDICARE

## 2025-08-25 VITALS
DIASTOLIC BLOOD PRESSURE: 90 MMHG | SYSTOLIC BLOOD PRESSURE: 150 MMHG | RESPIRATION RATE: 14 BRPM | HEART RATE: 96 BPM | BODY MASS INDEX: 29.9 KG/M2 | WEIGHT: 233 LBS | TEMPERATURE: 99 F | OXYGEN SATURATION: 98 % | HEIGHT: 74 IN

## 2025-08-25 DIAGNOSIS — U07.1 COVID-19: Primary | ICD-10-CM

## 2025-08-25 DIAGNOSIS — J02.9 SORE THROAT: ICD-10-CM

## 2025-08-25 DIAGNOSIS — U07.1 COVID-19 VIRUS DETECTED: ICD-10-CM

## 2025-08-25 LAB
CTP QC/QA: YES
SARS-COV+SARS-COV-2 AG RESP QL IA.RAPID: POSITIVE

## (undated) DEVICE — DRAPE MINOR PROCEDURE

## (undated) DEVICE — SEE L#120831

## (undated) DEVICE — SEE MEDLINE ITEM 152651

## (undated) DEVICE — GLOVE SURG ULTRA TOUCH 7

## (undated) DEVICE — Device

## (undated) DEVICE — WATER STERILE INJ 500ML BAG

## (undated) DEVICE — SET CYSTO IRRIGATION UNIV SPIK

## (undated) DEVICE — SPONGE GAUZE 16PLY 4X4

## (undated) DEVICE — JELLY LUBRICATING STERILE 5 GR

## (undated) DEVICE — SHEET DRAPE MEDIUM